# Patient Record
Sex: FEMALE | Race: ASIAN | NOT HISPANIC OR LATINO | URBAN - METROPOLITAN AREA
[De-identification: names, ages, dates, MRNs, and addresses within clinical notes are randomized per-mention and may not be internally consistent; named-entity substitution may affect disease eponyms.]

---

## 2017-06-13 ENCOUNTER — INPATIENT (INPATIENT)
Facility: HOSPITAL | Age: 27
LOS: 1 days | Discharge: HOME | End: 2017-06-15
Attending: OBSTETRICS & GYNECOLOGY | Admitting: OBSTETRICS & GYNECOLOGY

## 2017-06-13 VITALS — HEIGHT: 62 IN | WEIGHT: 147.71 LBS

## 2017-06-13 DIAGNOSIS — Z3A.00 WEEKS OF GESTATION OF PREGNANCY NOT SPECIFIED: ICD-10-CM

## 2017-06-13 DIAGNOSIS — O26.90 PREGNANCY RELATED CONDITIONS, UNSPECIFIED, UNSPECIFIED TRIMESTER: ICD-10-CM

## 2017-06-13 LAB
BASOPHILS # BLD AUTO: 0.01 K/UL — SIGNIFICANT CHANGE UP (ref 0–0.2)
BASOPHILS NFR BLD AUTO: 0.1 % — SIGNIFICANT CHANGE UP (ref 0–2)
BLD GP AB SCN SERPL QL: NEGATIVE — SIGNIFICANT CHANGE UP
EOSINOPHIL # BLD AUTO: 0.09 K/UL — SIGNIFICANT CHANGE UP (ref 0–0.5)
EOSINOPHIL NFR BLD AUTO: 1.2 % — SIGNIFICANT CHANGE UP (ref 0–6)
HCT VFR BLD CALC: 39.4 % — SIGNIFICANT CHANGE UP (ref 34.5–45)
HGB BLD-MCNC: 12.8 G/DL — SIGNIFICANT CHANGE UP (ref 11.5–15.5)
IMM GRANULOCYTES NFR BLD AUTO: 0.4 % — SIGNIFICANT CHANGE UP (ref 0–1.5)
LYMPHOCYTES # BLD AUTO: 1.61 K/UL — SIGNIFICANT CHANGE UP (ref 1–3.3)
LYMPHOCYTES # BLD AUTO: 21.4 % — SIGNIFICANT CHANGE UP (ref 13–44)
MCHC RBC-ENTMCNC: 29.3 PG — SIGNIFICANT CHANGE UP (ref 27–34)
MCHC RBC-ENTMCNC: 32.5 % — SIGNIFICANT CHANGE UP (ref 32–36)
MCV RBC AUTO: 90.2 FL — SIGNIFICANT CHANGE UP (ref 80–100)
MONOCYTES # BLD AUTO: 0.55 K/UL — SIGNIFICANT CHANGE UP (ref 0–0.9)
MONOCYTES NFR BLD AUTO: 7.3 % — SIGNIFICANT CHANGE UP (ref 2–14)
NEUTROPHILS # BLD AUTO: 5.25 K/UL — SIGNIFICANT CHANGE UP (ref 1.8–7.4)
NEUTROPHILS NFR BLD AUTO: 69.6 % — SIGNIFICANT CHANGE UP (ref 43–77)
PLATELET # BLD AUTO: 188 K/UL — SIGNIFICANT CHANGE UP (ref 150–400)
PMV BLD: 11.6 FL — SIGNIFICANT CHANGE UP (ref 7–13)
RBC # BLD: 4.37 M/UL — SIGNIFICANT CHANGE UP (ref 3.8–5.2)
RBC # FLD: 14.1 % — SIGNIFICANT CHANGE UP (ref 10.3–14.5)
RH IG SCN BLD-IMP: POSITIVE — SIGNIFICANT CHANGE UP
RH IG SCN BLD-IMP: POSITIVE — SIGNIFICANT CHANGE UP
T PALLIDUM AB TITR SER: NEGATIVE — SIGNIFICANT CHANGE UP
WBC # BLD: 7.54 K/UL — SIGNIFICANT CHANGE UP (ref 3.8–10.5)
WBC # FLD AUTO: 7.54 K/UL — SIGNIFICANT CHANGE UP (ref 3.8–10.5)

## 2017-06-13 RX ORDER — MAGNESIUM HYDROXIDE 400 MG/1
30 TABLET, CHEWABLE ORAL
Qty: 0 | Refills: 0 | Status: DISCONTINUED | OUTPATIENT
Start: 2017-06-13 | End: 2017-06-15

## 2017-06-13 RX ORDER — PRAMOXINE HYDROCHLORIDE 150 MG/15G
1 AEROSOL, FOAM RECTAL EVERY 4 HOURS
Qty: 0 | Refills: 0 | Status: DISCONTINUED | OUTPATIENT
Start: 2017-06-13 | End: 2017-06-13

## 2017-06-13 RX ORDER — PENICILLIN G POTASSIUM 5000000 [IU]/1
5 POWDER, FOR SOLUTION INTRAMUSCULAR; INTRAPLEURAL; INTRATHECAL; INTRAVENOUS ONCE
Qty: 0 | Refills: 0 | Status: COMPLETED | OUTPATIENT
Start: 2017-06-13 | End: 2017-06-13

## 2017-06-13 RX ORDER — TETANUS TOXOID, REDUCED DIPHTHERIA TOXOID AND ACELLULAR PERTUSSIS VACCINE, ADSORBED 5; 2.5; 8; 8; 2.5 [IU]/.5ML; [IU]/.5ML; UG/.5ML; UG/.5ML; UG/.5ML
0.5 SUSPENSION INTRAMUSCULAR ONCE
Qty: 0 | Refills: 0 | Status: COMPLETED | OUTPATIENT
Start: 2017-06-13

## 2017-06-13 RX ORDER — AER TRAVELER 0.5 G/1
1 SOLUTION RECTAL; TOPICAL EVERY 4 HOURS
Qty: 0 | Refills: 0 | Status: DISCONTINUED | OUTPATIENT
Start: 2017-06-13 | End: 2017-06-13

## 2017-06-13 RX ORDER — SODIUM CHLORIDE 9 MG/ML
3 INJECTION INTRAMUSCULAR; INTRAVENOUS; SUBCUTANEOUS EVERY 8 HOURS
Qty: 0 | Refills: 0 | Status: DISCONTINUED | OUTPATIENT
Start: 2017-06-13 | End: 2017-06-13

## 2017-06-13 RX ORDER — GLYCERIN ADULT
1 SUPPOSITORY, RECTAL RECTAL AT BEDTIME
Qty: 0 | Refills: 0 | Status: DISCONTINUED | OUTPATIENT
Start: 2017-06-13 | End: 2017-06-15

## 2017-06-13 RX ORDER — SODIUM CHLORIDE 9 MG/ML
1000 INJECTION, SOLUTION INTRAVENOUS ONCE
Qty: 0 | Refills: 0 | Status: COMPLETED | OUTPATIENT
Start: 2017-06-13 | End: 2017-06-13

## 2017-06-13 RX ORDER — ACETAMINOPHEN 500 MG
975 TABLET ORAL EVERY 6 HOURS
Qty: 0 | Refills: 0 | Status: COMPLETED | OUTPATIENT
Start: 2017-06-13 | End: 2018-05-12

## 2017-06-13 RX ORDER — OXYCODONE HYDROCHLORIDE 5 MG/1
5 TABLET ORAL
Qty: 0 | Refills: 0 | Status: DISCONTINUED | OUTPATIENT
Start: 2017-06-13 | End: 2017-06-15

## 2017-06-13 RX ORDER — DIPHENHYDRAMINE HCL 50 MG
25 CAPSULE ORAL EVERY 6 HOURS
Qty: 0 | Refills: 0 | Status: DISCONTINUED | OUTPATIENT
Start: 2017-06-13 | End: 2017-06-15

## 2017-06-13 RX ORDER — OXYTOCIN 10 UNIT/ML
41.67 VIAL (ML) INJECTION
Qty: 20 | Refills: 0 | Status: DISCONTINUED | OUTPATIENT
Start: 2017-06-13 | End: 2017-06-13

## 2017-06-13 RX ORDER — HYDROCORTISONE 1 %
1 OINTMENT (GRAM) TOPICAL EVERY 4 HOURS
Qty: 0 | Refills: 0 | Status: DISCONTINUED | OUTPATIENT
Start: 2017-06-13 | End: 2017-06-14

## 2017-06-13 RX ORDER — HYDROCORTISONE 1 %
1 OINTMENT (GRAM) TOPICAL EVERY 4 HOURS
Qty: 0 | Refills: 0 | Status: DISCONTINUED | OUTPATIENT
Start: 2017-06-13 | End: 2017-06-13

## 2017-06-13 RX ORDER — IBUPROFEN 200 MG
600 TABLET ORAL EVERY 6 HOURS
Qty: 0 | Refills: 0 | Status: DISCONTINUED | OUTPATIENT
Start: 2017-06-13 | End: 2017-06-15

## 2017-06-13 RX ORDER — PENICILLIN G POTASSIUM 5000000 [IU]/1
2.5 POWDER, FOR SOLUTION INTRAMUSCULAR; INTRAPLEURAL; INTRATHECAL; INTRAVENOUS EVERY 4 HOURS
Qty: 0 | Refills: 0 | Status: DISCONTINUED | OUTPATIENT
Start: 2017-06-13 | End: 2017-06-13

## 2017-06-13 RX ORDER — SIMETHICONE 80 MG/1
80 TABLET, CHEWABLE ORAL EVERY 6 HOURS
Qty: 0 | Refills: 0 | Status: DISCONTINUED | OUTPATIENT
Start: 2017-06-13 | End: 2017-06-15

## 2017-06-13 RX ORDER — IBUPROFEN 200 MG
600 TABLET ORAL EVERY 6 HOURS
Qty: 0 | Refills: 0 | Status: COMPLETED | OUTPATIENT
Start: 2017-06-13 | End: 2018-05-12

## 2017-06-13 RX ORDER — OXYTOCIN 10 UNIT/ML
2 VIAL (ML) INJECTION
Qty: 30 | Refills: 0 | Status: DISCONTINUED | OUTPATIENT
Start: 2017-06-13 | End: 2017-06-13

## 2017-06-13 RX ORDER — LANOLIN
1 OINTMENT (GRAM) TOPICAL EVERY 6 HOURS
Qty: 0 | Refills: 0 | Status: DISCONTINUED | OUTPATIENT
Start: 2017-06-13 | End: 2017-06-15

## 2017-06-13 RX ORDER — KETOROLAC TROMETHAMINE 30 MG/ML
30 SYRINGE (ML) INJECTION ONCE
Qty: 0 | Refills: 0 | Status: DISCONTINUED | OUTPATIENT
Start: 2017-06-13 | End: 2017-06-13

## 2017-06-13 RX ORDER — OXYCODONE HYDROCHLORIDE 5 MG/1
5 TABLET ORAL EVERY 4 HOURS
Qty: 0 | Refills: 0 | Status: DISCONTINUED | OUTPATIENT
Start: 2017-06-13 | End: 2017-06-15

## 2017-06-13 RX ORDER — SODIUM CHLORIDE 9 MG/ML
1000 INJECTION, SOLUTION INTRAVENOUS
Qty: 0 | Refills: 0 | Status: DISCONTINUED | OUTPATIENT
Start: 2017-06-13 | End: 2017-06-13

## 2017-06-13 RX ORDER — PRAMOXINE HYDROCHLORIDE 150 MG/15G
1 AEROSOL, FOAM RECTAL EVERY 4 HOURS
Qty: 0 | Refills: 0 | Status: DISCONTINUED | OUTPATIENT
Start: 2017-06-13 | End: 2017-06-14

## 2017-06-13 RX ORDER — DOCUSATE SODIUM 100 MG
100 CAPSULE ORAL
Qty: 0 | Refills: 0 | Status: DISCONTINUED | OUTPATIENT
Start: 2017-06-13 | End: 2017-06-15

## 2017-06-13 RX ORDER — OXYTOCIN 10 UNIT/ML
333.33 VIAL (ML) INJECTION
Qty: 20 | Refills: 0 | Status: COMPLETED | OUTPATIENT
Start: 2017-06-13 | End: 2017-06-13

## 2017-06-13 RX ORDER — ACETAMINOPHEN 500 MG
975 TABLET ORAL EVERY 6 HOURS
Qty: 0 | Refills: 0 | Status: DISCONTINUED | OUTPATIENT
Start: 2017-06-13 | End: 2017-06-15

## 2017-06-13 RX ORDER — DIBUCAINE 1 %
1 OINTMENT (GRAM) RECTAL EVERY 4 HOURS
Qty: 0 | Refills: 0 | Status: DISCONTINUED | OUTPATIENT
Start: 2017-06-13 | End: 2017-06-15

## 2017-06-13 RX ORDER — AER TRAVELER 0.5 G/1
1 SOLUTION RECTAL; TOPICAL EVERY 4 HOURS
Qty: 0 | Refills: 0 | Status: DISCONTINUED | OUTPATIENT
Start: 2017-06-13 | End: 2017-06-15

## 2017-06-13 RX ORDER — DIBUCAINE 1 %
1 OINTMENT (GRAM) RECTAL EVERY 4 HOURS
Qty: 0 | Refills: 0 | Status: DISCONTINUED | OUTPATIENT
Start: 2017-06-13 | End: 2017-06-13

## 2017-06-13 RX ORDER — PENICILLIN G POTASSIUM 5000000 [IU]/1
POWDER, FOR SOLUTION INTRAMUSCULAR; INTRAPLEURAL; INTRATHECAL; INTRAVENOUS
Qty: 0 | Refills: 0 | Status: DISCONTINUED | OUTPATIENT
Start: 2017-06-13 | End: 2017-06-13

## 2017-06-13 RX ORDER — OXYTOCIN 10 UNIT/ML
333.33 VIAL (ML) INJECTION
Qty: 20 | Refills: 0 | Status: COMPLETED | OUTPATIENT
Start: 2017-06-13

## 2017-06-13 RX ADMIN — Medication 125 MILLIUNIT(S)/MIN: at 09:18

## 2017-06-13 RX ADMIN — Medication 2 MILLIUNIT(S)/MIN: at 06:20

## 2017-06-13 RX ADMIN — Medication 30 MILLIGRAM(S): at 09:43

## 2017-06-13 RX ADMIN — Medication 975 MILLIGRAM(S): at 15:33

## 2017-06-13 RX ADMIN — PENICILLIN G POTASSIUM 200 MILLION UNIT(S): 5000000 POWDER, FOR SOLUTION INTRAMUSCULAR; INTRAPLEURAL; INTRATHECAL; INTRAVENOUS at 08:00

## 2017-06-13 RX ADMIN — Medication 1000 MILLIUNIT(S)/MIN: at 09:18

## 2017-06-13 RX ADMIN — SODIUM CHLORIDE 2000 MILLILITER(S): 9 INJECTION, SOLUTION INTRAVENOUS at 04:29

## 2017-06-13 RX ADMIN — PENICILLIN G POTASSIUM 200 MILLION UNIT(S): 5000000 POWDER, FOR SOLUTION INTRAMUSCULAR; INTRAPLEURAL; INTRATHECAL; INTRAVENOUS at 04:28

## 2017-06-13 RX ADMIN — SODIUM CHLORIDE 125 MILLILITER(S): 9 INJECTION, SOLUTION INTRAVENOUS at 04:29

## 2017-06-13 RX ADMIN — Medication 600 MILLIGRAM(S): at 22:30

## 2017-06-13 RX ADMIN — Medication 975 MILLIGRAM(S): at 16:17

## 2017-06-13 RX ADMIN — Medication 600 MILLIGRAM(S): at 15:33

## 2017-06-13 RX ADMIN — Medication 600 MILLIGRAM(S): at 16:17

## 2017-06-13 RX ADMIN — Medication 30 MILLIGRAM(S): at 09:58

## 2017-06-13 RX ADMIN — Medication 600 MILLIGRAM(S): at 21:32

## 2017-06-13 RX ADMIN — Medication 100 MILLIGRAM(S): at 21:32

## 2017-06-13 NOTE — DISCHARGE NOTE OB - PATIENT PORTAL LINK FT
“You can access the FollowHealth Patient Portal, offered by [facility], by registering with the following website: [email or web address]”

## 2017-06-13 NOTE — DISCHARGE NOTE OB - MATERIALS PROVIDED
Breastfeeding Guide and Packet/Back To Sleep Handout/Breastfeeding Mother’s Support Group Information/Guide to Postpartum Care/Vaccinations/Shaken Baby Prevention Handout/Breastfeeding Log/  Immunization Record/[facility] Yolyn Screening Program/[facility] Hearing Screen Program

## 2017-06-13 NOTE — DISCHARGE NOTE OB - CARE PLAN
Principal Discharge DX:	 (normal spontaneous vaginal delivery)  Goal:	Good recovery  Instructions for follow-up, activity and diet:	f/u 6 weeks

## 2017-06-14 RX ORDER — HYDROCORTISONE 1 %
1 OINTMENT (GRAM) TOPICAL EVERY 4 HOURS
Qty: 0 | Refills: 0 | Status: DISCONTINUED | OUTPATIENT
Start: 2017-06-14 | End: 2017-06-15

## 2017-06-14 RX ORDER — PRAMOXINE HYDROCHLORIDE 150 MG/15G
1 AEROSOL, FOAM RECTAL EVERY 4 HOURS
Qty: 0 | Refills: 0 | Status: DISCONTINUED | OUTPATIENT
Start: 2017-06-14 | End: 2017-06-15

## 2017-06-14 RX ADMIN — Medication 600 MILLIGRAM(S): at 15:40

## 2017-06-14 RX ADMIN — Medication 600 MILLIGRAM(S): at 09:17

## 2017-06-14 RX ADMIN — Medication 600 MILLIGRAM(S): at 16:30

## 2017-06-14 RX ADMIN — Medication 1 TABLET(S): at 09:17

## 2017-06-14 RX ADMIN — Medication 600 MILLIGRAM(S): at 04:45

## 2017-06-14 RX ADMIN — Medication 600 MILLIGRAM(S): at 10:16

## 2017-06-14 RX ADMIN — Medication 100 MILLIGRAM(S): at 09:17

## 2017-06-14 RX ADMIN — Medication 600 MILLIGRAM(S): at 04:18

## 2017-06-14 RX ADMIN — Medication 600 MILLIGRAM(S): at 21:39

## 2017-06-14 RX ADMIN — Medication 600 MILLIGRAM(S): at 22:30

## 2017-06-15 VITALS
TEMPERATURE: 98 F | DIASTOLIC BLOOD PRESSURE: 59 MMHG | HEART RATE: 82 BPM | OXYGEN SATURATION: 99 % | SYSTOLIC BLOOD PRESSURE: 101 MMHG | RESPIRATION RATE: 18 BRPM

## 2017-06-15 RX ORDER — TETANUS TOXOID, REDUCED DIPHTHERIA TOXOID AND ACELLULAR PERTUSSIS VACCINE, ADSORBED 5; 2.5; 8; 8; 2.5 [IU]/.5ML; [IU]/.5ML; UG/.5ML; UG/.5ML; UG/.5ML
0.5 SUSPENSION INTRAMUSCULAR ONCE
Qty: 0 | Refills: 0 | Status: COMPLETED | OUTPATIENT
Start: 2017-06-15 | End: 2017-06-15

## 2017-06-15 RX ADMIN — TETANUS TOXOID, REDUCED DIPHTHERIA TOXOID AND ACELLULAR PERTUSSIS VACCINE, ADSORBED 0.5 MILLILITER(S): 5; 2.5; 8; 8; 2.5 SUSPENSION INTRAMUSCULAR at 12:55

## 2017-06-15 RX ADMIN — Medication 600 MILLIGRAM(S): at 10:30

## 2017-06-15 RX ADMIN — Medication 600 MILLIGRAM(S): at 04:00

## 2017-06-15 RX ADMIN — Medication 975 MILLIGRAM(S): at 10:00

## 2017-06-15 RX ADMIN — Medication 600 MILLIGRAM(S): at 03:36

## 2017-06-15 RX ADMIN — Medication 975 MILLIGRAM(S): at 10:30

## 2017-06-15 RX ADMIN — Medication 600 MILLIGRAM(S): at 10:00

## 2019-12-12 ENCOUNTER — RESULT REVIEW (OUTPATIENT)
Age: 29
End: 2019-12-12

## 2020-10-06 NOTE — PATIENT PROFILE OB - PT NEEDS ASSIST
no Cimzia Counseling:  I discussed with the patient the risks of Cimzia including but not limited to immunosuppression, allergic reactions and infections.  The patient understands that monitoring is required including a PPD at baseline and must alert us or the primary physician if symptoms of infection or other concerning signs are noted.

## 2022-05-06 ENCOUNTER — APPOINTMENT (OUTPATIENT)
Dept: ANTEPARTUM | Facility: CLINIC | Age: 32
End: 2022-05-06
Payer: COMMERCIAL

## 2022-05-06 ENCOUNTER — ASOB RESULT (OUTPATIENT)
Age: 32
End: 2022-05-06

## 2022-05-06 PROCEDURE — 76802 OB US < 14 WKS ADDL FETUS: CPT

## 2022-05-06 PROCEDURE — 36416 COLLJ CAPILLARY BLOOD SPEC: CPT

## 2022-05-06 PROCEDURE — 76801 OB US < 14 WKS SINGLE FETUS: CPT | Mod: 59

## 2022-05-06 PROCEDURE — 76814 OB US NUCHAL MEAS ADD-ON: CPT

## 2022-05-06 PROCEDURE — 76813 OB US NUCHAL MEAS 1 GEST: CPT

## 2022-05-06 PROCEDURE — 99202 OFFICE O/P NEW SF 15 MIN: CPT | Mod: 25

## 2022-05-27 ENCOUNTER — APPOINTMENT (OUTPATIENT)
Dept: ANTEPARTUM | Facility: CLINIC | Age: 32
End: 2022-05-27
Payer: COMMERCIAL

## 2022-05-27 ENCOUNTER — ASOB RESULT (OUTPATIENT)
Age: 32
End: 2022-05-27

## 2022-05-27 PROCEDURE — 76815 OB US LIMITED FETUS(S): CPT

## 2022-05-27 PROCEDURE — 76817 TRANSVAGINAL US OBSTETRIC: CPT

## 2022-06-10 ENCOUNTER — APPOINTMENT (OUTPATIENT)
Dept: ANTEPARTUM | Facility: CLINIC | Age: 32
End: 2022-06-10
Payer: COMMERCIAL

## 2022-06-10 ENCOUNTER — ASOB RESULT (OUTPATIENT)
Age: 32
End: 2022-06-10

## 2022-06-10 PROCEDURE — 76815 OB US LIMITED FETUS(S): CPT

## 2022-06-28 ENCOUNTER — ASOB RESULT (OUTPATIENT)
Age: 32
End: 2022-06-28

## 2022-06-28 ENCOUNTER — APPOINTMENT (OUTPATIENT)
Dept: ANTEPARTUM | Facility: CLINIC | Age: 32
End: 2022-06-28

## 2022-06-28 PROCEDURE — 76812 OB US DETAILED ADDL FETUS: CPT

## 2022-06-28 PROCEDURE — 76811 OB US DETAILED SNGL FETUS: CPT

## 2022-06-28 PROCEDURE — 76817 TRANSVAGINAL US OBSTETRIC: CPT

## 2022-07-15 ENCOUNTER — APPOINTMENT (OUTPATIENT)
Dept: ANTEPARTUM | Facility: CLINIC | Age: 32
End: 2022-07-15

## 2022-07-15 ENCOUNTER — ASOB RESULT (OUTPATIENT)
Age: 32
End: 2022-07-15

## 2022-07-15 PROCEDURE — 76816 OB US FOLLOW-UP PER FETUS: CPT | Mod: 59

## 2022-07-15 PROCEDURE — 76817 TRANSVAGINAL US OBSTETRIC: CPT

## 2022-07-18 ENCOUNTER — APPOINTMENT (OUTPATIENT)
Dept: PEDIATRIC CARDIOLOGY | Facility: CLINIC | Age: 32
End: 2022-07-18
Payer: COMMERCIAL

## 2022-07-18 PROCEDURE — 93325 DOPPLER ECHO COLOR FLOW MAPG: CPT | Mod: 59

## 2022-07-18 PROCEDURE — 76821 MIDDLE CEREBRAL ARTERY ECHO: CPT | Mod: 59

## 2022-07-18 PROCEDURE — 76825 ECHO EXAM OF FETAL HEART: CPT

## 2022-07-18 PROCEDURE — 76827 ECHO EXAM OF FETAL HEART: CPT | Mod: 59

## 2022-07-18 PROCEDURE — 76820 UMBILICAL ARTERY ECHO: CPT | Mod: 59

## 2022-07-18 PROCEDURE — 99202 OFFICE O/P NEW SF 15 MIN: CPT | Mod: 25

## 2022-07-29 ENCOUNTER — APPOINTMENT (OUTPATIENT)
Dept: ANTEPARTUM | Facility: CLINIC | Age: 32
End: 2022-07-29

## 2022-07-29 ENCOUNTER — ASOB RESULT (OUTPATIENT)
Age: 32
End: 2022-07-29

## 2022-07-29 PROCEDURE — 76816 OB US FOLLOW-UP PER FETUS: CPT

## 2022-08-12 ENCOUNTER — ASOB RESULT (OUTPATIENT)
Age: 32
End: 2022-08-12

## 2022-08-12 ENCOUNTER — APPOINTMENT (OUTPATIENT)
Dept: ANTEPARTUM | Facility: CLINIC | Age: 32
End: 2022-08-12

## 2022-08-12 PROCEDURE — 76816 OB US FOLLOW-UP PER FETUS: CPT | Mod: 59

## 2022-08-15 ENCOUNTER — APPOINTMENT (OUTPATIENT)
Dept: PEDIATRIC CARDIOLOGY | Facility: CLINIC | Age: 32
End: 2022-08-15

## 2022-08-15 PROCEDURE — 93325 DOPPLER ECHO COLOR FLOW MAPG: CPT | Mod: 59

## 2022-08-15 PROCEDURE — 99212 OFFICE O/P EST SF 10 MIN: CPT | Mod: 25

## 2022-08-15 PROCEDURE — 76821 MIDDLE CEREBRAL ARTERY ECHO: CPT

## 2022-08-15 PROCEDURE — 76826 ECHO EXAM OF FETAL HEART: CPT

## 2022-08-15 PROCEDURE — 76820 UMBILICAL ARTERY ECHO: CPT

## 2022-08-15 PROCEDURE — 76828 ECHO EXAM OF FETAL HEART: CPT

## 2022-08-26 ENCOUNTER — ASOB RESULT (OUTPATIENT)
Age: 32
End: 2022-08-26

## 2022-08-26 ENCOUNTER — APPOINTMENT (OUTPATIENT)
Dept: ANTEPARTUM | Facility: CLINIC | Age: 32
End: 2022-08-26

## 2022-08-26 PROCEDURE — 76816 OB US FOLLOW-UP PER FETUS: CPT

## 2022-08-26 PROCEDURE — 76819 FETAL BIOPHYS PROFIL W/O NST: CPT | Mod: 59

## 2022-09-07 ENCOUNTER — APPOINTMENT (OUTPATIENT)
Dept: ANTEPARTUM | Facility: CLINIC | Age: 32
End: 2022-09-07

## 2022-09-09 ENCOUNTER — ASOB RESULT (OUTPATIENT)
Age: 32
End: 2022-09-09

## 2022-09-09 ENCOUNTER — APPOINTMENT (OUTPATIENT)
Dept: ANTEPARTUM | Facility: CLINIC | Age: 32
End: 2022-09-09

## 2022-09-09 PROCEDURE — 76816 OB US FOLLOW-UP PER FETUS: CPT | Mod: 59

## 2022-09-09 PROCEDURE — 76819 FETAL BIOPHYS PROFIL W/O NST: CPT

## 2022-09-09 PROCEDURE — 76819 FETAL BIOPHYS PROFIL W/O NST: CPT | Mod: 59

## 2022-09-16 ENCOUNTER — APPOINTMENT (OUTPATIENT)
Dept: ANTEPARTUM | Facility: CLINIC | Age: 32
End: 2022-09-16

## 2022-09-23 ENCOUNTER — APPOINTMENT (OUTPATIENT)
Dept: ANTEPARTUM | Facility: CLINIC | Age: 32
End: 2022-09-23

## 2022-09-23 ENCOUNTER — ASOB RESULT (OUTPATIENT)
Age: 32
End: 2022-09-23

## 2022-09-23 ENCOUNTER — OUTPATIENT (OUTPATIENT)
Dept: INPATIENT UNIT | Facility: HOSPITAL | Age: 32
LOS: 1 days | Discharge: HOME | End: 2022-09-23

## 2022-09-23 VITALS — HEART RATE: 81 BPM | DIASTOLIC BLOOD PRESSURE: 65 MMHG | SYSTOLIC BLOOD PRESSURE: 97 MMHG

## 2022-09-23 VITALS
RESPIRATION RATE: 16 BRPM | TEMPERATURE: 98 F | DIASTOLIC BLOOD PRESSURE: 62 MMHG | SYSTOLIC BLOOD PRESSURE: 105 MMHG | HEART RATE: 80 BPM

## 2022-09-23 DIAGNOSIS — Z3A.00 WEEKS OF GESTATION OF PREGNANCY NOT SPECIFIED: ICD-10-CM

## 2022-09-23 DIAGNOSIS — O26.899 OTHER SPECIFIED PREGNANCY RELATED CONDITIONS, UNSPECIFIED TRIMESTER: ICD-10-CM

## 2022-09-23 PROCEDURE — 76821 MIDDLE CEREBRAL ARTERY ECHO: CPT | Mod: 59

## 2022-09-23 PROCEDURE — 76818 FETAL BIOPHYS PROFILE W/NST: CPT | Mod: 59

## 2022-09-23 PROCEDURE — 76815 OB US LIMITED FETUS(S): CPT | Mod: 26

## 2022-09-23 PROCEDURE — 99214 OFFICE O/P EST MOD 30 MIN: CPT | Mod: 25

## 2022-09-23 NOTE — OB PROVIDER TRIAGE NOTE - NSOBPROVIDERNOTE_OBGYN_ALL_OB_FT
31yo female  @ 32.6 wks with mono/di TIUP here from ATU for prolonged monitoring   -fetus A with 3 min late deceleration   -pt for 2 hour NST 31yo female  @ 32.6 wks with mono/di TIUP here from ATU for prolonged monitoring   -fetus A with 3 min late deceleration   -pt for 2 hour NST  ----------------  -12:39p-NST x 2 hours reveal a cat I NST x 2 approved by service atejareth  -pt was tam Arreguin. Pt was cleared for discharge home with instructions to keep her next ATU and office visit  -discharge instructions given

## 2022-09-23 NOTE — OB PROVIDER TRIAGE NOTE - HISTORY OF PRESENT ILLNESS
31yo female  @ 32.6 wks with mono/di TIUP pregnancy here from ATU for prolonged monitoring due to 3 min late deceleration of fetus A. Pt reports GFM x 2, denies VB/ LOF/ ctx's.    Pmhx- denies  Pshx/Hosp- denies  Meds- PNV; Iron; ASA 81mg  NKDA  Gyn-small 1 cm fibroid  Past ob- 2013-  FT 7#10 uncomp                2017-  FT 7#11 uncomp  Soc-denies

## 2022-09-23 NOTE — OB RN TRIAGE NOTE - NS_SISCREENINGSR_GEN_ALL_ED
8/21/20 [facility]     -  NO PRECERT REQUIRED - FRANCESCO DEL ROSARIO @ [facility]  REF #  [id number]  MP Negative

## 2022-09-23 NOTE — OB PROVIDER TRIAGE NOTE - NSHPPHYSICALEXAM_GEN_ALL_CORE
Vital Signs Last 24 Hrs  T(C): 36.9 (23 Sep 2022 10:36), Max: 36.9 (23 Sep 2022 10:17)  T(F): 98.42 (23 Sep 2022 10:36), Max: 98.42 (23 Sep 2022 10:36)  HR: 80 (23 Sep 2022 10:43) (80 - 80)  BP: 105/62 (23 Sep 2022 10:43) (105/62 - 105/62)  BP(mean): --  ABP: --  ABP(mean): --  RR: 16 (23 Sep 2022 10:36) (16 - 16)  SpO2: --    Gen: A&O x 3; NAD    Pulm-CTA B/L; no wheezes  Cor-clear S1S2  Abd exam-soft and nontender    NST in progress    ATU sono- fetus A-transverse; head to maternal Left; posterior placenta; MVP-4.51; MCA Doppler WNL; 8/8 BPP                  fetus B-vtx (and is presenting); posterior placenta; MVP-3.4; MCA Dopplers wnL; 8/8 BPP Vital Signs Last 24 Hrs  T(C): 36.9 (23 Sep 2022 10:36), Max: 36.9 (23 Sep 2022 10:17)  T(F): 98.42 (23 Sep 2022 10:36), Max: 98.42 (23 Sep 2022 10:36)  HR: 80 (23 Sep 2022 10:43) (80 - 80)  BP: 105/62 (23 Sep 2022 10:43) (105/62 - 105/62)  BP(mean): --  ABP: --  ABP(mean): --  RR: 16 (23 Sep 2022 10:36) (16 - 16)  SpO2: --    Gen: A&O x 3; NAD    Pulm-CTA B/L; no wheezes  Cor-clear S1S2  Abd exam-soft and nontender    NST x 2 hours is cat I with accels and mod variability; irritability with scattered ctx's (approved for discharge by Dr Medina)    ATU sono- fetus A-transverse; head to maternal Left; posterior placenta; MVP-4.51; MCA Doppler WNL; 8/8 BPP                  fetus B-vtx (and is presenting); posterior placenta; MVP-3.4; MCA Dopplers wnL; 8/8 BPP

## 2022-09-23 NOTE — OB RN TRIAGE NOTE - FALL HARM RISK - HARM RISK INTERVENTIONS

## 2022-09-30 ENCOUNTER — ASOB RESULT (OUTPATIENT)
Age: 32
End: 2022-09-30

## 2022-09-30 ENCOUNTER — APPOINTMENT (OUTPATIENT)
Dept: ANTEPARTUM | Facility: CLINIC | Age: 32
End: 2022-09-30

## 2022-09-30 PROBLEM — D25.9 LEIOMYOMA OF UTERUS, UNSPECIFIED: Chronic | Status: ACTIVE | Noted: 2022-09-23

## 2022-09-30 PROCEDURE — 76816 OB US FOLLOW-UP PER FETUS: CPT | Mod: 59

## 2022-09-30 PROCEDURE — 76818 FETAL BIOPHYS PROFILE W/NST: CPT | Mod: 59

## 2022-09-30 PROCEDURE — 76818 FETAL BIOPHYS PROFILE W/NST: CPT

## 2022-09-30 PROCEDURE — 76821 MIDDLE CEREBRAL ARTERY ECHO: CPT

## 2022-09-30 PROCEDURE — 76821 MIDDLE CEREBRAL ARTERY ECHO: CPT | Mod: 59

## 2022-10-07 ENCOUNTER — ASOB RESULT (OUTPATIENT)
Age: 32
End: 2022-10-07

## 2022-10-07 ENCOUNTER — APPOINTMENT (OUTPATIENT)
Dept: ANTEPARTUM | Facility: CLINIC | Age: 32
End: 2022-10-07

## 2022-10-07 ENCOUNTER — OUTPATIENT (OUTPATIENT)
Dept: OUTPATIENT SERVICES | Facility: HOSPITAL | Age: 32
LOS: 1 days | End: 2022-10-07

## 2022-10-07 VITALS
TEMPERATURE: 99 F | OXYGEN SATURATION: 98 % | HEART RATE: 87 BPM | WEIGHT: 154.98 LBS | SYSTOLIC BLOOD PRESSURE: 110 MMHG | RESPIRATION RATE: 14 BRPM | HEIGHT: 62 IN | DIASTOLIC BLOOD PRESSURE: 76 MMHG

## 2022-10-07 DIAGNOSIS — Z78.9 OTHER SPECIFIED HEALTH STATUS: Chronic | ICD-10-CM

## 2022-10-07 DIAGNOSIS — O30.039 TWIN PREGNANCY, MONOCHORIONIC/DIAMNIOTIC, UNSPECIFIED TRIMESTER: ICD-10-CM

## 2022-10-07 DIAGNOSIS — O30.009 TWIN PREGNANCY, UNSPECIFIED NUMBER OF PLACENTA AND UNSPECIFIED NUMBER OF AMNIOTIC SACS, UNSPECIFIED TRIMESTER: ICD-10-CM

## 2022-10-07 LAB
BLD GP AB SCN SERPL QL: NEGATIVE — SIGNIFICANT CHANGE UP
HCT VFR BLD CALC: 34.9 % — SIGNIFICANT CHANGE UP (ref 34.5–45)
HGB BLD-MCNC: 11.5 G/DL — SIGNIFICANT CHANGE UP (ref 11.5–15.5)
MCHC RBC-ENTMCNC: 29.9 PG — SIGNIFICANT CHANGE UP (ref 27–34)
MCHC RBC-ENTMCNC: 33 GM/DL — SIGNIFICANT CHANGE UP (ref 32–36)
MCV RBC AUTO: 90.9 FL — SIGNIFICANT CHANGE UP (ref 80–100)
NRBC # BLD: 0 /100 WBCS — SIGNIFICANT CHANGE UP (ref 0–0)
NRBC # FLD: 0 K/UL — SIGNIFICANT CHANGE UP (ref 0–0)
PLATELET # BLD AUTO: 135 K/UL — LOW (ref 150–400)
RBC # BLD: 3.84 M/UL — SIGNIFICANT CHANGE UP (ref 3.8–5.2)
RBC # FLD: 13.7 % — SIGNIFICANT CHANGE UP (ref 10.3–14.5)
RH IG SCN BLD-IMP: POSITIVE — SIGNIFICANT CHANGE UP
WBC # BLD: 6.71 K/UL — SIGNIFICANT CHANGE UP (ref 3.8–10.5)
WBC # FLD AUTO: 6.71 K/UL — SIGNIFICANT CHANGE UP (ref 3.8–10.5)

## 2022-10-07 PROCEDURE — 76821 MIDDLE CEREBRAL ARTERY ECHO: CPT

## 2022-10-07 PROCEDURE — 76818 FETAL BIOPHYS PROFILE W/NST: CPT | Mod: 59

## 2022-10-07 PROCEDURE — 76818 FETAL BIOPHYS PROFILE W/NST: CPT

## 2022-10-07 RX ORDER — FERROUS SULFATE 325(65) MG
0 TABLET ORAL
Qty: 0 | Refills: 0 | DISCHARGE

## 2022-10-07 RX ORDER — ASPIRIN/CALCIUM CARB/MAGNESIUM 324 MG
0 TABLET ORAL
Qty: 0 | Refills: 0 | DISCHARGE

## 2022-10-07 NOTE — OB PST NOTE - NSICDXPASTMEDICALHX_GEN_ALL_CORE_FT
PAST MEDICAL HISTORY:  Uterine fibroid      PAST MEDICAL HISTORY:  Twin pregnancy     Uterine fibroid

## 2022-10-07 NOTE — OB PST NOTE - WEIGHT: PREPREGNANCY IN LBS
03/14/22 1752   Reason for Consult   Reason for Consult Bedside activities;Initial assessment   Patient Intervention(s)   Type of Intervention Performed Normalizing and coping   Normalizing and Coping Intervention(s) Activities to promote developmental play  (developmentally appropriate toys)   Support Provided to Family   Support Provided to Family Parent/caregiver(s)   Parent/Caregiver's Name Mother   Parent/Caregiver(s) Intervention Active listening   Anxiety Level   Anxiety Level No distress noted or observed   Evaluation   Patient Behaviors During Interventions Appropriate for developmental level;Calm;Cooperative;Playful;Interactive;Makes eye contact   Persons Present Family   Evaluation/Plan of Care Patient/family receptive;Provide ongoing support     Writer provided developmentally appropriate toys and conversed briefly with pt's mother regarding reason for hospitalization. Child Life services will remain available to provide support to patient and family throughout admission.     Ailyn Colón MS, CCLS  Certified Child Life Specialist  #    [phone]

## 2022-10-07 NOTE — OB PST NOTE - PROBLEM SELECTOR PLAN 1
Pt scheduled for primary  section twin gestation on 10/21/2022.  labs done results pending,  Hibiclens provided-  written and verbal instructions given with teach back, pt able to verbalize understanding.  Preop teaching done, pt able to verbalize understanding.   medications as per ob/gyn

## 2022-10-07 NOTE — OB PST NOTE - NSHPREVIEWOFSYSTEMS_GEN_ALL_CORE
General: No fever, chills, sweating, anorexia, weight loss or weight gain. No polyphagia, polyurea, polydypsia, malaise, or fatigue    Skin: No rashes, itching, or dryness. No change in size/color of moles. No tumors, brittle nails, pitted nails, or hair loss    Breast: No tenderness, lumps, or nipple discharge      Ophthalmologic: No diplopia, photophobia, lacrimation, blurred Vision , or eye discharge    ENMT Symptoms: No hearing difficulty, ear pain, tinnitus, or vertigo. No sinus symptoms, nasal congestion, nasal   discharge, or nasal obstruction    Respiratory and Thorax: No wheezing, dyspnea, cough, hemoptysis, or pleuritic chest pPain     Cardiovascular: No chest pain, palpitations, dyspnea on exertion, orthopnea, paroxysmal nocturnal dyspnea,   peripheral edema, or claudication    Gastrointestinal: No nausea, vomiting, diarrhea, constipation, change in bowel habits, flatulence, abdominal pain, or melena    Genitourinary/ Pelvis: positive pregnancy, reports good fetal movement No hematuria, renal colic, or flank pain.  No urine discoloration, incontinence, dysuria, or urinary hesitancy. Normal urinary frequency. No nocturia, abnormal vaginal bleeding, vaginal discharge, spotting, pelvic pain, or vaginal leakage    Musculoskeletal: No arthralgia, arthritis, joint swelling, muscle cramping, muscle weakness, neck pain, arm pain, or leg pain    Neurological: No transient paralysis, weakness, paresthesias, or seizures. No syncope, tremors, vertigo, loss of sensation, difficulty walking, loss of consciousness, hemiparesis, confusion, or facial palsy    Psychiatric: No suicidal ideation, depression, anxiety, insomnia, memory loss, paranoia, mood swings, agitation, hallucinations, or hyperactivity    Hematology: No gum bleeding, nose bleeding, or skin lumps    Lymphatic: No enlarged or tender lymph nodes. No extremity swelling    Endocrine: No heat or cold intolerance    Immunologic: No recurrent or persistent infections

## 2022-10-07 NOTE — OB PST NOTE - NSANTHOSAYNRD_GEN_A_CORE
No. MAGGY screening performed.  STOP BANG Legend: 0-2 = LOW Risk; 3-4 = INTERMEDIATE Risk; 5-8 = HIGH Risk

## 2022-10-07 NOTE — OB PST NOTE - NSHPPHYSICALEXAM_GEN_ALL_CORE
Constitutional: Well Developed, Well Groomed, Well Nourished, No Distress    Eyes: PERRL, EOMI, conjunctiva clear    Ears: Normal    Mouth & Gums: Normal, moist    Pharynx: No tenderness, discharge, or peritonsillar abscess    Tonsils: No Redness, discharge, tenderness, or swelling    Neck: Supple, no JVD, normal thyroid glands, no carotid bruits, no cervical vertebral or paraspinal tenderness    Breast: Normal shape, no masses, no tenderness, nipples normal, no nipple discharge    Back: Normal shape, ROM intact, strength intact, no vertebral tenderness    Respiratory: Airway patent, breath sounds equal, good air movement, respiration non-labored, clear to auscultation bilateral, no chest wall tenderness, no intercostal retractions, no rales, no wheezes, no rhonchi, no subcutaneous emphysema    Cardiovascular:  Regular rate and rhythm, no rubs or murmur, normal PMI    Gastrointestinal: positive pregnancy Soft, non-tender bowel sound normal, no bruit, no rebound tenderness    Extremities: No clubbing, cyanosis, or pedal edema    Vascular:  Carotid Pulse normal , Radial Pulse normal, Femoral Pulse normal, DP pulse normal, PT pulse normal    Neurological: alert & oriented x 3, sensation intact, deep reflexes intact, cranial nerve intact, normal strength    Skin: warm and dry, normal color    Lymph Nodes: normal posterior cervical lymph node, normal anterior cervical lymph node, normal supraclavicular lymph node, normal axillary lymph node, normal inguinal lymph node, normal femoral lymph node    Musculoskeletal: ROM intact, no joint swelling, warmth, or calf tenderness. Normal strength    Psychiatric: normal affect, normal behavior Constitutional: Well Developed, Well Groomed, Well Nourished, No Distress    Eyes: PERRL, EOMI, conjunctiva clear    Ears: Normal    Mouth & Gums: Normal, moist    Pharynx: No tenderness, discharge, or peritonsillar abscess    Tonsils: No Redness, discharge, tenderness, or swelling    Neck: Supple, no JVD, normal thyroid glands, no carotid bruits, no cervical vertebral or paraspinal tenderness    Breast: Normal shape, no masses, no tenderness, nipples normal, no nipple discharge    Back: Normal shape, ROM intact, strength intact, no vertebral tenderness    Respiratory: Airway patent, breath sounds equal, good air movement, respiration non-labored, clear to auscultation bilateral, no chest wall tenderness, no intercostal retractions, no rales, no wheezes, no rhonchi, no subcutaneous emphysema    Cardiovascular:  positive murmur 2/6 Regular rate and rhythm, no rubs, normal PMI    Gastrointestinal: positive pregnancy Soft, non-tender bowel sound normal, no bruit, no rebound tenderness    Extremities: No clubbing, cyanosis, or pedal edema    Vascular:  Carotid Pulse normal , Radial Pulse normal, Femoral Pulse normal, DP pulse normal, PT pulse normal    Neurological: alert & oriented x 3, sensation intact, deep reflexes intact, cranial nerve intact, normal strength    Skin: warm and dry, normal color    Lymph Nodes: normal posterior cervical lymph node, normal anterior cervical lymph node, normal supraclavicular lymph node, normal axillary lymph node, normal inguinal lymph node, normal femoral lymph node    Musculoskeletal: ROM intact, no joint swelling, warmth, or calf tenderness. Normal strength    Psychiatric: normal affect, normal behavior

## 2022-10-07 NOTE — OB PST NOTE - HISTORY OF PRESENT ILLNESS
31y/o female scheduled for primary c- section twin gestation on 10/21/2022.  As per scheduled  worksheet having c- section for twins, reports good fetal movement.

## 2022-10-09 ENCOUNTER — TRANSCRIPTION ENCOUNTER (OUTPATIENT)
Age: 32
End: 2022-10-09

## 2022-10-10 ENCOUNTER — RESULT REVIEW (OUTPATIENT)
Age: 32
End: 2022-10-10

## 2022-10-10 ENCOUNTER — TRANSCRIPTION ENCOUNTER (OUTPATIENT)
Age: 32
End: 2022-10-10

## 2022-10-10 ENCOUNTER — INPATIENT (INPATIENT)
Facility: HOSPITAL | Age: 32
LOS: 2 days | Discharge: HOME | End: 2022-10-13
Attending: STUDENT IN AN ORGANIZED HEALTH CARE EDUCATION/TRAINING PROGRAM | Admitting: STUDENT IN AN ORGANIZED HEALTH CARE EDUCATION/TRAINING PROGRAM

## 2022-10-10 VITALS
SYSTOLIC BLOOD PRESSURE: 107 MMHG | RESPIRATION RATE: 16 BRPM | DIASTOLIC BLOOD PRESSURE: 68 MMHG | HEART RATE: 88 BPM | OXYGEN SATURATION: 100 % | TEMPERATURE: 98 F

## 2022-10-10 DIAGNOSIS — O42.90 PREMATURE RUPTURE OF MEMBRANES, UNSPECIFIED AS TO LENGTH OF TIME BETWEEN RUPTURE AND ONSET OF LABOR, UNSPECIFIED WEEKS OF GESTATION: ICD-10-CM

## 2022-10-10 DIAGNOSIS — O26.899 OTHER SPECIFIED PREGNANCY RELATED CONDITIONS, UNSPECIFIED TRIMESTER: ICD-10-CM

## 2022-10-10 DIAGNOSIS — O30.009 TWIN PREGNANCY, UNSPECIFIED NUMBER OF PLACENTA AND UNSPECIFIED NUMBER OF AMNIOTIC SACS, UNSPECIFIED TRIMESTER: ICD-10-CM

## 2022-10-10 DIAGNOSIS — Z78.9 OTHER SPECIFIED HEALTH STATUS: Chronic | ICD-10-CM

## 2022-10-10 DIAGNOSIS — Z3A.00 WEEKS OF GESTATION OF PREGNANCY NOT SPECIFIED: ICD-10-CM

## 2022-10-10 LAB
BASOPHILS # BLD AUTO: 0.02 K/UL — SIGNIFICANT CHANGE UP (ref 0–0.2)
BASOPHILS NFR BLD AUTO: 0.3 % — SIGNIFICANT CHANGE UP (ref 0–2)
BLD GP AB SCN SERPL QL: NEGATIVE — SIGNIFICANT CHANGE UP
COVID-19 SPIKE DOMAIN AB INTERP: POSITIVE
COVID-19 SPIKE DOMAIN ANTIBODY RESULT: >250 U/ML — HIGH
EOSINOPHIL # BLD AUTO: 0.1 K/UL — SIGNIFICANT CHANGE UP (ref 0–0.5)
EOSINOPHIL NFR BLD AUTO: 1.7 % — SIGNIFICANT CHANGE UP (ref 0–6)
HCT VFR BLD CALC: 38.4 % — SIGNIFICANT CHANGE UP (ref 34.5–45)
HGB BLD-MCNC: 12.5 G/DL — SIGNIFICANT CHANGE UP (ref 11.5–15.5)
IANC: 3.36 K/UL — SIGNIFICANT CHANGE UP (ref 1.8–7.4)
IMM GRANULOCYTES NFR BLD AUTO: 0.5 % — SIGNIFICANT CHANGE UP (ref 0–0.9)
LYMPHOCYTES # BLD AUTO: 1.84 K/UL — SIGNIFICANT CHANGE UP (ref 1–3.3)
LYMPHOCYTES # BLD AUTO: 31.5 % — SIGNIFICANT CHANGE UP (ref 13–44)
MCHC RBC-ENTMCNC: 29.7 PG — SIGNIFICANT CHANGE UP (ref 27–34)
MCHC RBC-ENTMCNC: 32.6 GM/DL — SIGNIFICANT CHANGE UP (ref 32–36)
MCV RBC AUTO: 91.2 FL — SIGNIFICANT CHANGE UP (ref 80–100)
MONOCYTES # BLD AUTO: 0.49 K/UL — SIGNIFICANT CHANGE UP (ref 0–0.9)
MONOCYTES NFR BLD AUTO: 8.4 % — SIGNIFICANT CHANGE UP (ref 2–14)
NEUTROPHILS # BLD AUTO: 3.36 K/UL — SIGNIFICANT CHANGE UP (ref 1.8–7.4)
NEUTROPHILS NFR BLD AUTO: 57.6 % — SIGNIFICANT CHANGE UP (ref 43–77)
NRBC # BLD: 0 /100 WBCS — SIGNIFICANT CHANGE UP (ref 0–0)
NRBC # FLD: 0 K/UL — SIGNIFICANT CHANGE UP (ref 0–0)
PLATELET # BLD AUTO: 142 K/UL — LOW (ref 150–400)
RBC # BLD: 4.21 M/UL — SIGNIFICANT CHANGE UP (ref 3.8–5.2)
RBC # FLD: 14 % — SIGNIFICANT CHANGE UP (ref 10.3–14.5)
RH IG SCN BLD-IMP: POSITIVE — SIGNIFICANT CHANGE UP
SARS-COV-2 IGG+IGM SERPL QL IA: >250 U/ML — HIGH
SARS-COV-2 IGG+IGM SERPL QL IA: POSITIVE
SARS-COV-2 RNA SPEC QL NAA+PROBE: SIGNIFICANT CHANGE UP
WBC # BLD: 5.84 K/UL — SIGNIFICANT CHANGE UP (ref 3.8–10.5)
WBC # FLD AUTO: 5.84 K/UL — SIGNIFICANT CHANGE UP (ref 3.8–10.5)

## 2022-10-10 PROCEDURE — 88307 TISSUE EXAM BY PATHOLOGIST: CPT | Mod: 26

## 2022-10-10 DEVICE — SURGICEL SNOW 2 X 4": Type: IMPLANTABLE DEVICE | Status: FUNCTIONAL

## 2022-10-10 RX ORDER — FAMOTIDINE 10 MG/ML
20 INJECTION INTRAVENOUS ONCE
Refills: 0 | Status: DISCONTINUED | OUTPATIENT
Start: 2022-10-10 | End: 2022-10-10

## 2022-10-10 RX ORDER — MAGNESIUM HYDROXIDE 400 MG/1
30 TABLET, CHEWABLE ORAL
Refills: 0 | Status: DISCONTINUED | OUTPATIENT
Start: 2022-10-10 | End: 2022-10-13

## 2022-10-10 RX ORDER — AMPICILLIN TRIHYDRATE 250 MG
1 CAPSULE ORAL EVERY 4 HOURS
Refills: 0 | Status: DISCONTINUED | OUTPATIENT
Start: 2022-10-10 | End: 2022-10-10

## 2022-10-10 RX ORDER — IBUPROFEN 200 MG
600 TABLET ORAL EVERY 6 HOURS
Refills: 0 | Status: COMPLETED | OUTPATIENT
Start: 2022-10-10 | End: 2023-09-08

## 2022-10-10 RX ORDER — ASPIRIN/CALCIUM CARB/MAGNESIUM 324 MG
1 TABLET ORAL
Qty: 0 | Refills: 0 | DISCHARGE

## 2022-10-10 RX ORDER — LANOLIN
1 OINTMENT (GRAM) TOPICAL EVERY 6 HOURS
Refills: 0 | Status: DISCONTINUED | OUTPATIENT
Start: 2022-10-10 | End: 2022-10-13

## 2022-10-10 RX ORDER — HEPARIN SODIUM 5000 [USP'U]/ML
5000 INJECTION INTRAVENOUS; SUBCUTANEOUS EVERY 12 HOURS
Refills: 0 | Status: DISCONTINUED | OUTPATIENT
Start: 2022-10-10 | End: 2022-10-13

## 2022-10-10 RX ORDER — ACETAMINOPHEN 500 MG
975 TABLET ORAL
Refills: 0 | Status: DISCONTINUED | OUTPATIENT
Start: 2022-10-10 | End: 2022-10-13

## 2022-10-10 RX ORDER — CHLORHEXIDINE GLUCONATE 213 G/1000ML
1 SOLUTION TOPICAL ONCE
Refills: 0 | Status: DISCONTINUED | OUTPATIENT
Start: 2022-10-10 | End: 2022-10-10

## 2022-10-10 RX ORDER — TETANUS TOXOID, REDUCED DIPHTHERIA TOXOID AND ACELLULAR PERTUSSIS VACCINE, ADSORBED 5; 2.5; 8; 8; 2.5 [IU]/.5ML; [IU]/.5ML; UG/.5ML; UG/.5ML; UG/.5ML
0.5 SUSPENSION INTRAMUSCULAR ONCE
Refills: 0 | Status: DISCONTINUED | OUTPATIENT
Start: 2022-10-10 | End: 2022-10-13

## 2022-10-10 RX ORDER — CITRIC ACID/SODIUM CITRATE 300-500 MG
30 SOLUTION, ORAL ORAL ONCE
Refills: 0 | Status: DISCONTINUED | OUTPATIENT
Start: 2022-10-10 | End: 2022-10-10

## 2022-10-10 RX ORDER — DIPHENHYDRAMINE HCL 50 MG
25 CAPSULE ORAL EVERY 6 HOURS
Refills: 0 | Status: COMPLETED | OUTPATIENT
Start: 2022-10-10 | End: 2023-09-08

## 2022-10-10 RX ORDER — SIMETHICONE 80 MG/1
80 TABLET, CHEWABLE ORAL EVERY 4 HOURS
Refills: 0 | Status: DISCONTINUED | OUTPATIENT
Start: 2022-10-10 | End: 2022-10-13

## 2022-10-10 RX ORDER — OXYCODONE HYDROCHLORIDE 5 MG/1
5 TABLET ORAL ONCE
Refills: 0 | Status: DISCONTINUED | OUTPATIENT
Start: 2022-10-10 | End: 2022-10-13

## 2022-10-10 RX ORDER — DIPHENHYDRAMINE HCL 50 MG
25 CAPSULE ORAL ONCE
Refills: 0 | Status: COMPLETED | OUTPATIENT
Start: 2022-10-10 | End: 2022-10-11

## 2022-10-10 RX ORDER — OXYTOCIN 10 UNIT/ML
333.33 VIAL (ML) INJECTION
Qty: 20 | Refills: 0 | Status: DISCONTINUED | OUTPATIENT
Start: 2022-10-10 | End: 2022-10-11

## 2022-10-10 RX ORDER — SODIUM CHLORIDE 9 MG/ML
1000 INJECTION, SOLUTION INTRAVENOUS
Refills: 0 | Status: DISCONTINUED | OUTPATIENT
Start: 2022-10-10 | End: 2022-10-11

## 2022-10-10 RX ORDER — AMPICILLIN TRIHYDRATE 250 MG
2 CAPSULE ORAL ONCE
Refills: 0 | Status: DISCONTINUED | OUTPATIENT
Start: 2022-10-10 | End: 2022-10-10

## 2022-10-10 RX ORDER — SODIUM CHLORIDE 9 MG/ML
1000 INJECTION, SOLUTION INTRAVENOUS
Refills: 0 | Status: DISCONTINUED | OUTPATIENT
Start: 2022-10-10 | End: 2022-10-10

## 2022-10-10 RX ORDER — AMPICILLIN TRIHYDRATE 250 MG
CAPSULE ORAL
Refills: 0 | Status: DISCONTINUED | OUTPATIENT
Start: 2022-10-10 | End: 2022-10-10

## 2022-10-10 RX ORDER — OXYCODONE HYDROCHLORIDE 5 MG/1
5 TABLET ORAL
Refills: 0 | Status: COMPLETED | OUTPATIENT
Start: 2022-10-10 | End: 2022-10-17

## 2022-10-10 RX ADMIN — SODIUM CHLORIDE 125 MILLILITER(S): 9 INJECTION, SOLUTION INTRAVENOUS at 11:01

## 2022-10-10 RX ADMIN — Medication 975 MILLIGRAM(S): at 20:34

## 2022-10-10 RX ADMIN — Medication 975 MILLIGRAM(S): at 21:05

## 2022-10-10 NOTE — OB PROVIDER DELIVERY SUMMARY - NSPROVIDERDELIVERYNOTE_OBGYN_ALL_OB_FT
unscheduled pLTCS for cat 2 tracing of NRFHT of baby B in the setting on mono/di twins  A. Viable male infant. APGARS 8/9. 2410g  B. Viable male infant. APGARS 9/9. 2495g  Hysterotomy closed in single layer using Caprosyn  Surgicel powder placed atop hysterotomy  Grossly normal uterus, tubes, and ovaries  Abdomen closed in standard fashion  Pt and to recovery in stable condition. Infants to NICU   EBL: 544  IVF: 1500  UOP: 250    Dictation:    Modesta Peck, PGY-2 unscheduled pLTCS for cat 2 tracing of NRFHT of baby B in the setting on mono/di twins  A. Viable male infant. APGARS 8/9. 2410g  B. Viable male infant. APGARS 9/9. 2495g  Hysterotomy closed in single layer using Caprosyn  Surgicel powder placed atop hysterotomy  Grossly normal uterus, tubes, and ovaries  Abdomen closed in standard fashion  Pt and to recovery in stable condition. Infants to NICU   EBL: 544  IVF: 1500  UOP: 250    Dictation:  [id number]  Modesta Peck, PGY-2

## 2022-10-10 NOTE — DISCHARGE NOTE OB - PATIENT PORTAL LINK FT
You can access the FollowMyHealth Patient Portal offered by [facility] by registering at the following website: [email or web address]. By joining [email or web address]’s FollowMyHealth portal, you will also be able to view your health information using other applications (apps) compatible with our system.

## 2022-10-10 NOTE — OB PROVIDER H&P - ASSESSMENT
32 year old female P2 at 35.3 weeks gestation Mono/DI TIUP  prom for IOL with po Cytotec   scan vertex /vertex  on admission   pt counselled extensively by  Dr Cano as to mode of delivery ( CS vs VD )   pt opting for attempt at vaginal delivery     COVID 19 testing   TXC 2 U     FRANCES VALDIVIA

## 2022-10-10 NOTE — OB PROVIDER TRIAGE NOTE - NSOBPROVIDERNOTE_OBGYN_ALL_OB_FT
32 year old female P2 at 35.3 weeks gestation Mono/DI TIUP  prom    scan vertex /vertex    pt counselled extensively by  Dr Cano as to mode of delivery ( CS vs VD )   pt opting for     COVID 19 testing   TXC 2 U     FRANCES VALDIVIA 32 year old female P2 at 35.3 weeks gestation Mono/DI TIUP  prom for IOL with po Cytotec   scan vertex /vertex  on admission   pt counselled extensively by  Dr Cano as to mode of delivery ( CS vs VD )   pt opting for attempt at vaginal delivery     COVID 19 testing   TXC 2 U     FRANCES VALDIVIA

## 2022-10-10 NOTE — OB RN TRIAGE NOTE - FALL HARM RISK - HARM RISK INTERVENTIONS

## 2022-10-10 NOTE — OB PROVIDER H&P - HISTORY OF PRESENT ILLNESS
32 year old female P20Elmo at 35.3 weeks (mono/Di) TIUP gestation who presents with feeling LOF clear fluid and continues to feel leakage    feels mild contractions  feels GFM x2     denied any ap issues denied any fetal issues    stated that she is scheduled for Primary CS on 10/21 as has been non vertex positions on scan   stated last scan  was vtx/unstable lie ( transverse )    stated GBS done but unknown results     med hx denied   surghx  denied   meds pnvqd   allergies  NKDA     OB hx   2013 m 7-10   2017 m 7-11   GYN hx fibroids   willing to accept blood products   PNC records  reviewed   fetal echo normal x2.  GBS unknown    10/2 scan vtx.transverse  A 2275g/B 2181G BPP nl x2  posterior placenta

## 2022-10-10 NOTE — OB PROVIDER TRIAGE NOTE - HISTORY OF PRESENT ILLNESS
32 year old female  at 35.3 weeks (mono/Di)  TIUP  gestation who presents  32 year old female P20Elmo at 35.3 weeks (mono/Di) TIUP gestation who presents with feeling LOF clear fluid and continues to feel leakage    feels mild contractions  feels GFM x2     denied any ap issues denied any fetal issues    stated that she is scheduled for Primary CS on 10/21 as has been non vertex positions on scan   stated last scan  was vtx/unstable lie ( transverse )    stated GBS done but unknown results     med hx denied   surghx  denied   meds pnvqd   allergies  NKDA     OB hx   2013 m 7-10   2017 m 7-11   GYN hx fibroids   willing to accept blood products   PNC records  reviewed   fetal echo normal x2   GBS unknown    10/2 scan vtx.transverse  A 2275g/B 2181G BPP nl x2  posterior placenta    32 year old female P20Elmo at 35.3 weeks (mono/Di) TIUP gestation who presents with feeling LOF clear fluid and continues to feel leakage    feels mild contractions  feels GFM x2     denied any ap issues denied any fetal issues    stated that she is scheduled for Primary CS on 10/21 as has been non vertex positions on scan   stated last scan  was vtx/unstable lie ( transverse )    stated GBS done but unknown results     med hx denied   surghx  denied   meds pnvqd   allergies  NKDA     OB hx   2013 m 7-10   2017 m 7-11   GYN hx fibroids   willing to accept blood products   PNC records  reviewed   fetal echo normal x2.  GBS unknown    10/2 scan vtx.transverse  A 2275g/B 2181G BPP nl x2  posterior placenta

## 2022-10-10 NOTE — DISCHARGE NOTE OB - CARE PLAN
1 Assessment and plan of treatment:	35 weeks with PROM twins mono/di  cat 2 FHTs   Principal Discharge DX:	Term birth of twins  Assessment and plan of treatment:	35 weeks with PROM twins mono/di  cat 2 FHTs

## 2022-10-10 NOTE — OB PROVIDER DELIVERY SUMMARY - NSSELHIDDEN_OBGYN_ALL_OB_FT
[NS_DeliveryAttending1_OBGYN_ALL_OB_FT:[id number]=],[NS_DeliveryAssist1_OBGYN_ALL_OB_FT:[id number]=]

## 2022-10-10 NOTE — OB RN DELIVERY SUMMARY - NSSELHIDDEN_OBGYN_ALL_OB_FT
[NS_DeliveryAttending1_OBGYN_ALL_OB_FT:[id number]=],[NS_DeliveryAssist1_OBGYN_ALL_OB_FT:[id number]=],[NS_DeliveryRN_OBGYN_ALL_OB_FT:[id number]]

## 2022-10-10 NOTE — OB RN INTRAOPERATIVE NOTE - NSSELHIDDEN_OBGYN_ALL_OB_FT
[NS_DeliveryAttending1_OBGYN_ALL_OB_FT:[id number]],[NS_DeliveryAssist1_OBGYN_ALL_OB_FT:[id number]=],[NS_DeliveryRN_OBGYN_ALL_OB_FT:[id number]=]

## 2022-10-10 NOTE — OB RN DELIVERY SUMMARY - NS_SEPSISRSKCALC_OBGYN_ALL_OB_FT
EOS calculated successfully. EOS Risk Factor: 0.5/1000 live births ([facility] national incidence); GA=35w3d; Temp=98.6; ROM=9.983; GBS='Negative'; Antibiotics='No antibiotics or any antibiotics < 2 hrs prior to birth'

## 2022-10-10 NOTE — DISCHARGE NOTE OB - CARE PROVIDER_API CALL
Ashanti Cano (DO)  Obstetrics and Gynecology  [address] [place] [address]  Phone: [phone]  Follow Up Time:

## 2022-10-10 NOTE — OB RN PATIENT PROFILE - FALL HARM RISK - HARM RISK INTERVENTIONS

## 2022-10-10 NOTE — OB PROVIDER TRIAGE NOTE - NSHPPHYSICALEXAM_GEN_ALL_CORE
pt seen and examined   ICU Vital Signs Last 24 Hrs  T(C): 36.8 (10 Oct 2022 08:54), Max: 36.8 (10 Oct 2022 08:42)  T(F): 98.24 (10 Oct 2022 08:54), Max: 98.24 (10 Oct 2022 08:54)  HR: 80 (10 Oct 2022 09:47) (74 - 88)  BP: 106/66 (10 Oct 2022 09:47) (98/64 - 107/68)  RR: 16 (10 Oct 2022 08:42) (16 - 16)  SpO2: 100% (10 Oct 2022 08:42) (100% - 100%)    pt in NAD   lungs clear   heart s1 s2   abdomen  soft  gravid non tender   placed on EFM   A                             B FHR 1135   SSE pooling clear fluid  Nitrazine positive Fern positive   VE 1/30/-3/post      scan  abd vertex/vertex  AAFI x2 BPP x2 normal

## 2022-10-10 NOTE — OB PROVIDER DELIVERY SUMMARY - NSPOSTOPDXA_OBGYN_ALL_OB
Same as Pre-Op Diagnosis ADL without Chest pain No heavy lifting, driving, sex, tub baths, swimming, or any activity that submerges the lower half of the body in water for 48 hours.  Limited walking and stairs for 48 hours.    Change the bandaid after 24 hours and every 24 hours after that.  Keep the puncture site dry and covered with a bandaid until a scab forms.    Observe the site frequently.  If bleeding or a large lump (the size of a golf ball or bigger) occurs lie flat, apply continuous direct pressure just above the puncture site for at least 10 minutes, and notify your physician immediately.  If the bleeding cannot be controlled, call 911 immediately for assistance.  Notify your physician of pain, swelling or any drainage.    Notify your physician immediately if coldness, numbness, discoloration or pain in your foot occurs.

## 2022-10-10 NOTE — OB RN DELIVERY SUMMARY - NSSTERILIZETYPE_OBGYN_ALL_OB
I suspect the swelling is secondary to the surgery.  Keep elevated at home.  Follow up with her primary care and orthopedic surgeon when he returns home.    Return for any other concerns or significant worsening issues.  
None

## 2022-10-10 NOTE — DISCHARGE NOTE OB - NS MD DC FALL RISK RISK
For information on Fall & Injury Prevention, visit: [email or web address] OR  [email or web address] OR  https://www.cdc.gov/steadi/patient.html

## 2022-10-10 NOTE — OB NEONATOLOGY/PEDIATRICIAN DELIVERY SUMMARY - NSPEDSNEONOTESB_OBGYN_ALL_OB_FT
Called by OBGYN to attend CS delivery due to  and mono di twins. Baby is product of a 35.3 week gestation born to a  32 y.o. F. Maternal labs include Blood Type AB+, HIV-, RPR NR, HepB NR, GBS-. Maternal history is nonsignificant. Pregnancy was complicated by mono di twins. SROM on 10/10 at 06:40 with clear fluid. Delivery complicated by  Baby emerged crying and vigorous. Infant was brought to radiant warmer and warmed, dried, stimulated and suctioned. HR>100, normal respiratory effort with APGARS of 9/9. Delayed cord clamping was not performed. Resuscitation included CPAP 5/21% for 5 mins. Highest maternal temperature 37C. EOS score: 0.61. Admit to NICU.    Mom plans to initiate breastfeeding, consents Hep B vaccine and consents circ.    Physical Exam:  Gen: NAD, +grimace  HEENT: anterior fontanel open soft and flat, no cleft lip/palate, ears normal set, no ear pits or tags. no lesions in mouth/throat, nares clinically patent  Resp: no increased work of breathing, good air entry b/l, clear to auscultation bilaterally  Cardio: normal S1/S2, regular rate and rhythm, no murmur appreciated  Abd: soft, non tender, non distended, + bowel sounds, umbilical cord with 3 vessels  Back: spine midline, no sacral dimple or tuft of hair  Neuro: +grasp/suck/sameer/palmar/plantar, normal tone  Extremities: negative duncan and ortolani, moving all extremities, full range of motion x 4, no crepitus  Skin: pink, warm  Genitals: Normal male anatomy, testicles palpable in scrotum b/l, Omari 1, anus patent

## 2022-10-11 LAB
BASOPHILS # BLD AUTO: 0.02 K/UL — SIGNIFICANT CHANGE UP (ref 0–0.2)
BASOPHILS NFR BLD AUTO: 0.2 % — SIGNIFICANT CHANGE UP (ref 0–2)
EOSINOPHIL # BLD AUTO: 0.01 K/UL — SIGNIFICANT CHANGE UP (ref 0–0.5)
EOSINOPHIL NFR BLD AUTO: 0.1 % — SIGNIFICANT CHANGE UP (ref 0–6)
HCT VFR BLD CALC: 34.4 % — LOW (ref 34.5–45)
HGB BLD-MCNC: 11.6 G/DL — SIGNIFICANT CHANGE UP (ref 11.5–15.5)
IANC: 7.77 K/UL — HIGH (ref 1.8–7.4)
IMM GRANULOCYTES NFR BLD AUTO: 0.4 % — SIGNIFICANT CHANGE UP (ref 0–0.9)
LYMPHOCYTES # BLD AUTO: 1.98 K/UL — SIGNIFICANT CHANGE UP (ref 1–3.3)
LYMPHOCYTES # BLD AUTO: 18.4 % — SIGNIFICANT CHANGE UP (ref 13–44)
MCHC RBC-ENTMCNC: 30 PG — SIGNIFICANT CHANGE UP (ref 27–34)
MCHC RBC-ENTMCNC: 33.7 GM/DL — SIGNIFICANT CHANGE UP (ref 32–36)
MCV RBC AUTO: 88.9 FL — SIGNIFICANT CHANGE UP (ref 80–100)
MONOCYTES # BLD AUTO: 0.94 K/UL — HIGH (ref 0–0.9)
MONOCYTES NFR BLD AUTO: 8.7 % — SIGNIFICANT CHANGE UP (ref 2–14)
NEUTROPHILS # BLD AUTO: 7.77 K/UL — HIGH (ref 1.8–7.4)
NEUTROPHILS NFR BLD AUTO: 72.2 % — SIGNIFICANT CHANGE UP (ref 43–77)
NRBC # BLD: 0 /100 WBCS — SIGNIFICANT CHANGE UP (ref 0–0)
NRBC # FLD: 0 K/UL — SIGNIFICANT CHANGE UP (ref 0–0)
PLATELET # BLD AUTO: 180 K/UL — SIGNIFICANT CHANGE UP (ref 150–400)
RBC # BLD: 3.87 M/UL — SIGNIFICANT CHANGE UP (ref 3.8–5.2)
RBC # FLD: 13.6 % — SIGNIFICANT CHANGE UP (ref 10.3–14.5)
T PALLIDUM AB TITR SER: NEGATIVE — SIGNIFICANT CHANGE UP
WBC # BLD: 10.76 K/UL — HIGH (ref 3.8–10.5)
WBC # FLD AUTO: 10.76 K/UL — HIGH (ref 3.8–10.5)

## 2022-10-11 RX ORDER — SENNA PLUS 8.6 MG/1
1 TABLET ORAL
Refills: 0 | Status: DISCONTINUED | OUTPATIENT
Start: 2022-10-11 | End: 2022-10-13

## 2022-10-11 RX ORDER — IBUPROFEN 200 MG
600 TABLET ORAL EVERY 6 HOURS
Refills: 0 | Status: DISCONTINUED | OUTPATIENT
Start: 2022-10-11 | End: 2022-10-13

## 2022-10-11 RX ORDER — FERROUS SULFATE 325(65) MG
325 TABLET ORAL DAILY
Refills: 0 | Status: DISCONTINUED | OUTPATIENT
Start: 2022-10-11 | End: 2022-10-13

## 2022-10-11 RX ORDER — NALBUPHINE HYDROCHLORIDE 10 MG/ML
2.5 INJECTION, SOLUTION INTRAMUSCULAR; INTRAVENOUS; SUBCUTANEOUS ONCE
Refills: 0 | Status: COMPLETED | OUTPATIENT
Start: 2022-10-11 | End: 2022-10-12

## 2022-10-11 RX ORDER — DIPHENHYDRAMINE HCL 50 MG
25 CAPSULE ORAL EVERY 6 HOURS
Refills: 0 | Status: DISCONTINUED | OUTPATIENT
Start: 2022-10-11 | End: 2022-10-13

## 2022-10-11 RX ADMIN — Medication 25 MILLIGRAM(S): at 06:15

## 2022-10-11 RX ADMIN — Medication 975 MILLIGRAM(S): at 01:41

## 2022-10-11 RX ADMIN — Medication 25 MILLIGRAM(S): at 00:03

## 2022-10-11 RX ADMIN — Medication 975 MILLIGRAM(S): at 21:00

## 2022-10-11 RX ADMIN — Medication 600 MILLIGRAM(S): at 17:14

## 2022-10-11 RX ADMIN — HEPARIN SODIUM 5000 UNIT(S): 5000 INJECTION INTRAVENOUS; SUBCUTANEOUS at 17:15

## 2022-10-11 RX ADMIN — HEPARIN SODIUM 5000 UNIT(S): 5000 INJECTION INTRAVENOUS; SUBCUTANEOUS at 00:03

## 2022-10-11 RX ADMIN — Medication 600 MILLIGRAM(S): at 05:50

## 2022-10-11 RX ADMIN — Medication 975 MILLIGRAM(S): at 09:30

## 2022-10-11 RX ADMIN — Medication 600 MILLIGRAM(S): at 17:52

## 2022-10-11 RX ADMIN — Medication 975 MILLIGRAM(S): at 02:11

## 2022-10-11 RX ADMIN — Medication 975 MILLIGRAM(S): at 21:45

## 2022-10-11 RX ADMIN — Medication 975 MILLIGRAM(S): at 08:42

## 2022-10-11 RX ADMIN — Medication 600 MILLIGRAM(S): at 05:20

## 2022-10-11 NOTE — PROGRESS NOTE ADULT - ASSESSMENT
A/P: 31yo POD#1 s/p pLTCS for Cat II tracing of baby B in setting of PPROM at 35w3d TIUP (vertex/vertex) attempting IOL for vaginal delivery. . Patient is stable and doing well post-operatively.    - Continue regular diet.  - Increase ambulation.  - Continue motrin, tylenol, oxycodone PRN for pain control  - Hct: 38.4->34.4    Marck Hawkins MD PGY1

## 2022-10-12 RX ORDER — OXYCODONE HYDROCHLORIDE 5 MG/1
5 TABLET ORAL
Refills: 0 | Status: DISCONTINUED | OUTPATIENT
Start: 2022-10-12 | End: 2022-10-13

## 2022-10-12 RX ADMIN — Medication 600 MILLIGRAM(S): at 18:57

## 2022-10-12 RX ADMIN — Medication 600 MILLIGRAM(S): at 06:45

## 2022-10-12 RX ADMIN — HEPARIN SODIUM 5000 UNIT(S): 5000 INJECTION INTRAVENOUS; SUBCUTANEOUS at 18:15

## 2022-10-12 RX ADMIN — HEPARIN SODIUM 5000 UNIT(S): 5000 INJECTION INTRAVENOUS; SUBCUTANEOUS at 06:06

## 2022-10-12 RX ADMIN — Medication 1 TABLET(S): at 13:09

## 2022-10-12 RX ADMIN — Medication 975 MILLIGRAM(S): at 15:55

## 2022-10-12 RX ADMIN — OXYCODONE HYDROCHLORIDE 5 MILLIGRAM(S): 5 TABLET ORAL at 20:32

## 2022-10-12 RX ADMIN — Medication 975 MILLIGRAM(S): at 03:00

## 2022-10-12 RX ADMIN — Medication 600 MILLIGRAM(S): at 18:13

## 2022-10-12 RX ADMIN — SENNA PLUS 1 TABLET(S): 8.6 TABLET ORAL at 18:15

## 2022-10-12 RX ADMIN — OXYCODONE HYDROCHLORIDE 5 MILLIGRAM(S): 5 TABLET ORAL at 20:02

## 2022-10-12 RX ADMIN — Medication 600 MILLIGRAM(S): at 06:06

## 2022-10-12 RX ADMIN — Medication 600 MILLIGRAM(S): at 00:00

## 2022-10-12 RX ADMIN — Medication 975 MILLIGRAM(S): at 15:21

## 2022-10-12 RX ADMIN — Medication 975 MILLIGRAM(S): at 03:45

## 2022-10-12 RX ADMIN — Medication 600 MILLIGRAM(S): at 00:45

## 2022-10-12 RX ADMIN — NALBUPHINE HYDROCHLORIDE 2.5 MILLIGRAM(S): 10 INJECTION, SOLUTION INTRAMUSCULAR; INTRAVENOUS; SUBCUTANEOUS at 02:33

## 2022-10-12 RX ADMIN — Medication 325 MILLIGRAM(S): at 13:09

## 2022-10-12 RX ADMIN — SENNA PLUS 1 TABLET(S): 8.6 TABLET ORAL at 06:04

## 2022-10-12 RX ADMIN — NALBUPHINE HYDROCHLORIDE 2.5 MILLIGRAM(S): 10 INJECTION, SOLUTION INTRAMUSCULAR; INTRAVENOUS; SUBCUTANEOUS at 03:00

## 2022-10-13 VITALS
TEMPERATURE: 99 F | HEART RATE: 80 BPM | DIASTOLIC BLOOD PRESSURE: 80 MMHG | RESPIRATION RATE: 18 BRPM | SYSTOLIC BLOOD PRESSURE: 116 MMHG | OXYGEN SATURATION: 99 %

## 2022-10-13 RX ORDER — IBUPROFEN 200 MG
1 TABLET ORAL
Qty: 0 | Refills: 0 | DISCHARGE
Start: 2022-10-13

## 2022-10-13 RX ORDER — ACETAMINOPHEN 500 MG
3 TABLET ORAL
Qty: 0 | Refills: 0 | DISCHARGE
Start: 2022-10-13

## 2022-10-13 RX ADMIN — Medication 975 MILLIGRAM(S): at 04:03

## 2022-10-13 RX ADMIN — HEPARIN SODIUM 5000 UNIT(S): 5000 INJECTION INTRAVENOUS; SUBCUTANEOUS at 06:12

## 2022-10-13 RX ADMIN — Medication 975 MILLIGRAM(S): at 11:25

## 2022-10-13 RX ADMIN — Medication 975 MILLIGRAM(S): at 10:26

## 2022-10-13 RX ADMIN — Medication 600 MILLIGRAM(S): at 06:40

## 2022-10-13 RX ADMIN — Medication 600 MILLIGRAM(S): at 06:12

## 2022-10-13 RX ADMIN — SENNA PLUS 1 TABLET(S): 8.6 TABLET ORAL at 06:12

## 2022-10-13 RX ADMIN — Medication 975 MILLIGRAM(S): at 04:36

## 2022-10-13 NOTE — PROGRESS NOTE ADULT - ASSESSMENT
Assessment and Plan:   · Assessment	  A/P: 31yo POD#3 s/p pLTCS for Cat II tracing of baby B in setting of PPROM at 35w3d TIUP (vertex/vertex) attempting IOL for vaginal delivery. . Patient is stable and doing well post-operatively.      Her pain is well controlled.   She is tolerating a regular diet and passing flatus.   Denies N/V. Denies CP/SOB/lightheadedness/dizziness.   She is ambulating without difficulty.   Voiding spontaneously.    Patient is stable and doing well post-operatively.    - Continue regular diet.  - Increase ambulation.  - Continue motrin, tylenol, oxycodone PRN for pain control.   -Encourage breastfeeding.  -Incisional care and PO instructions reviewed.     Follow up @ [facility] in 2 weeks for incision check visit  [phone]    Discussed with MD Cortez Campbell

## 2022-10-13 NOTE — PROGRESS NOTE ADULT - SUBJECTIVE AND OBJECTIVE BOX
OB Progress Note:  Delivery, POD#1    S: 33yo POD#1 s/p pLTCS for Cat II tracing of baby B in setting of PPROM at 35w3d TIUP (vertex/vertex) attempting IOL for vaginal delivery. . Her pain is well controlled. She is tolerating a regular diet and passing small amts of flatus. Denies N/V. Denies CP/SOB/lightheadedness/dizziness.   She is ambulating without difficulty.   Voiding spontaneously.     O:   Vital Signs Last 24 Hrs  T(C): 36.3 (11 Oct 2022 09:53), Max: 37 (10 Oct 2022 12:51)  T(F): 97.3 (11 Oct 2022 09:53), Max: 98.6 (10 Oct 2022 12:51)  HR: 90 (11 Oct 2022 09:53) (49 - 97)  BP: 92/63 (11 Oct 2022 09:53) (85/56 - 134/81)  BP(mean): 94 (10 Oct 2022 19:45) (63 - 95)  RR: 18 (11 Oct 2022 06:00) (14 - 23)  SpO2: 98% (11 Oct 2022 09:53) (91% - 100%)    Parameters below as of 10 Oct 2022 21:26  Patient On (Oxygen Delivery Method): room air    Labs:  Blood type: AB Positive  Rubella IgG: RPR: Negative                          11.6   10.76<H> >-----------< 180    ( 10-11 @ 07:01 )             34.4<L>                        12.5   5.84 >-----------< 142<L>    ( 10-10 @ 11:00 )             38.4      PE:  General: NAD  Abdomen: Mildly distended and tympanic to percussion, appropriately tender, incision c/d/i with dermabond  Extremities: No erythema, no pitting edema
POST OP DAY  1  s/p   SECTION    SUBJECTIVE:  I'm ok.    PAIN SCALE SCORE: [x] Refer to charted pain scores    THERAPY:  [  ] Spinal morphine   [ x ] Epidural morphine   [  ] IV PCA Hydromorphone 1 mg/ml    OBJECTIVE:  Comfortable Appearing walking in room    SEDATION SCORE:	  [ x ] Alert	    [  ] Drowsy        [  ] Arousable	[  ] Asleep	[  ] Unresponsive    Side Effects:	  [  ] None	     [ x ] Nausea        [ x ] Pruritus        [  ] Weakness   [  ] Numbness        ASSESSMENT/ PLAN   [   ] Discontinue         [  ] Continue    [ x ] Change to prn Analgesics as per primary service.    DOCUMENTATION & VERIFICATION OF CURRENT MEDS [ x ] Done    COMMENTS: No Headache.  Initially patient had complained of only nausea - able to tolerate PO but while I was leaving the room ,the patient endorsed pruritis calling it allergies.  I have explained to her that if either of these known side effects of morphine are bothering her to please ask her nurse for medication which can treat both of these side effects.
S: 33yo POD#2 s/p pLTCS mono/di twins at 35.3 weeks PPROM Pain is well controlled. She is tolerating a regular diet and passing flatus. She is voiding spontaneously, and ambulating without difficulty. Denies CP/SOB. Denies lightheadedness/dizziness. Denies N/V.    O:  Vitals:  Vital Signs Last 24 Hrs  T(C): 37 (12 Oct 2022 06:00), Max: 37 (12 Oct 2022 06:00)  T(F): 98.6 (12 Oct 2022 06:00), Max: 98.6 (12 Oct 2022 06:00)  HR: 66 (12 Oct 2022 06:00) (66 - 93)  BP: 96/59 (12 Oct 2022 06:00) (92/63 - 100/64)  BP(mean): --  RR: 18 (12 Oct 2022 06:00) (18 - 18)  SpO2: 98% (12 Oct 2022 06:00) (98% - 98%)    Parameters below as of 12 Oct 2022 06:00  Patient On (Oxygen Delivery Method): room air        MEDICATIONS  (STANDING):  acetaminophen     Tablet .. 975 milliGRAM(s) Oral <User Schedule>  diphtheria/tetanus/pertussis (acellular) Vaccine (ADAcel) 0.5 milliLiter(s) IntraMuscular once  ferrous    sulfate 325 milliGRAM(s) Oral daily  heparin   Injectable 5000 Unit(s) SubCutaneous every 12 hours  ibuprofen  Tablet. 600 milliGRAM(s) Oral every 6 hours  prenatal multivitamin 1 Tablet(s) Oral daily  senna 1 Tablet(s) Oral two times a day      MEDICATIONS  (PRN):  diphenhydrAMINE 25 milliGRAM(s) Oral every 6 hours PRN Pruritus  lanolin Ointment 1 Application(s) Topical every 6 hours PRN Sore Nipples  magnesium hydroxide Suspension 30 milliLiter(s) Oral two times a day PRN Constipation  oxyCODONE    IR 5 milliGRAM(s) Oral every 3 hours PRN Moderate to Severe Pain (4-10)  oxyCODONE    IR 5 milliGRAM(s) Oral once PRN Moderate to Severe Pain (4-10)  simethicone 80 milliGRAM(s) Chew every 4 hours PRN Gas      Labs:  Blood type: AB Positive  Rubella IgG: RPR: Negative                          11.6   10.76<H> >-----------< 180    ( 10-11 @ 07:01 )             34.4<L>                        12.5   5.84 >-----------< 142<L>    ( 10-10 @ 11:00 )             38.4        PE:  General: NAD  Abdomen: Soft, appropriately tender, incision c/d/i with dermabond  Lochia: WNL  Extremities: No calf tenderness    A/P: 33yo POD#2 s/p pLTCS.  Patient is stable and doing well post-operatively.    - Continue regular diet.  - Encouraged use of abdominal binder  - Increase ambulation.  - Continue motrin, tylenol, oxycodone PRN for pain control.    - Discharge planning    Devorah MCCLENDON
Post-Operative Note, C/S  She is a  32y woman who is now post-operative day: 3    Subjective:  The patient feels well.  She is ambulating.   She is tolerating regular diet.  She denies nausea and vomiting; denies fever.  She is voiding.  Her pain is controlled; incisional pain is appropriate.  She reports normal postpartum bleeding.  She is breastfeeding.  She is formula feeding.    Physical exam:    Vital Signs Last 24 Hrs  T(C): 37 (13 Oct 2022 06:54), Max: 37 (13 Oct 2022 06:54)  T(F): 98.6 (13 Oct 2022 06:54), Max: 98.6 (13 Oct 2022 06:54)  HR: 73 (13 Oct 2022 06:54) (73 - 79)  BP: 98/60 (13 Oct 2022 06:54) (98/60 - 108/63)  BP(mean): --  RR: 16 (13 Oct 2022 06:54) (16 - 18)  SpO2: 100% (13 Oct 2022 06:54) (100% - 100%)        Gen: NAD  Breast: Soft, nontender, not engorged.  Abdomen: Soft, nontender, no distension , firm uterine fundus at umbilicus.  Incision: C/D/I.  Pelvic: Normal lochia noted  Ext: No calf tenderness    LABS:      Rubella status:     Allergies    No Known Allergies    Intolerances      MEDICATIONS  (STANDING):  acetaminophen     Tablet .. 975 milliGRAM(s) Oral <User Schedule>  diphtheria/tetanus/pertussis (acellular) Vaccine (ADAcel) 0.5 milliLiter(s) IntraMuscular once  ferrous    sulfate 325 milliGRAM(s) Oral daily  heparin   Injectable 5000 Unit(s) SubCutaneous every 12 hours  ibuprofen  Tablet. 600 milliGRAM(s) Oral every 6 hours  prenatal multivitamin 1 Tablet(s) Oral daily  senna 1 Tablet(s) Oral two times a day    MEDICATIONS  (PRN):  diphenhydrAMINE 25 milliGRAM(s) Oral every 6 hours PRN Pruritus  lanolin Ointment 1 Application(s) Topical every 6 hours PRN Sore Nipples  magnesium hydroxide Suspension 30 milliLiter(s) Oral two times a day PRN Constipation  oxyCODONE    IR 5 milliGRAM(s) Oral once PRN Moderate to Severe Pain (4-10)  oxyCODONE    IR 5 milliGRAM(s) Oral every 3 hours PRN Moderate to Severe Pain (4-10)  simethicone 80 milliGRAM(s) Chew every 4 hours PRN Gas

## 2022-10-14 ENCOUNTER — APPOINTMENT (OUTPATIENT)
Dept: ANTEPARTUM | Facility: CLINIC | Age: 32
End: 2022-10-14

## 2022-10-21 ENCOUNTER — APPOINTMENT (OUTPATIENT)
Dept: ANTEPARTUM | Facility: CLINIC | Age: 32
End: 2022-10-21

## 2022-10-22 LAB — SURGICAL PATHOLOGY STUDY: SIGNIFICANT CHANGE UP

## 2023-09-25 NOTE — OB PROVIDER H&P - OB VTE ASSESSMENT
<<--- Click to launch Methotrexate Pregnancy And Lactation Text: This medication is Pregnancy Category X and is known to cause fetal harm. This medication is excreted in breast milk.

## 2024-03-06 NOTE — OB PROVIDER H&P - NSPPHRISKEVAL_OBGYN_ALL_OB
[FreeTextEntry1] : 54 y/o presents for annual visit.  No GYN complaints. Pt with hx of abdominal myomectomy  Menopause at age 51, reports occasional hot flashes. Denies PMB  Not sexually active   HM Pap NILM HPV neg 2019 Mammogram Feb 2024, WNL per pt  Colonoscopy 2016, repeat in 10 years  DEXA 2024, WNL per pt Over distended uterus (polyhydramnios, macrosomia, multiple gestation)
